# Patient Record
Sex: FEMALE | URBAN - METROPOLITAN AREA
[De-identification: names, ages, dates, MRNs, and addresses within clinical notes are randomized per-mention and may not be internally consistent; named-entity substitution may affect disease eponyms.]

---
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
## 2022-12-03 ENCOUNTER — HOSPITAL ENCOUNTER (EMERGENCY)
Age: 22
Discharge: LEFT BEFORE DISCHARGE | End: 2022-12-03
Payer: MEDICAID

## 2022-12-03 ENCOUNTER — APPOINTMENT (OUTPATIENT)
Dept: GENERAL RADIOLOGY | Age: 22
End: 2022-12-03
Payer: MEDICAID

## 2022-12-03 VITALS
SYSTOLIC BLOOD PRESSURE: 97 MMHG | WEIGHT: 150 LBS | RESPIRATION RATE: 16 BRPM | OXYGEN SATURATION: 96 % | DIASTOLIC BLOOD PRESSURE: 75 MMHG | HEART RATE: 72 BPM | TEMPERATURE: 98.2 F

## 2022-12-03 DIAGNOSIS — M25.569 KNEE PAIN, UNSPECIFIED CHRONICITY, UNSPECIFIED LATERALITY: Primary | ICD-10-CM

## 2022-12-03 PROCEDURE — 99211 OFF/OP EST MAY X REQ PHY/QHP: CPT

## 2022-12-03 RX ORDER — DOCUSATE SODIUM 100 MG/1
100 CAPSULE, LIQUID FILLED ORAL 2 TIMES DAILY
COMMUNITY

## 2022-12-03 ASSESSMENT — PAIN DESCRIPTION - DESCRIPTORS: DESCRIPTORS: ACHING

## 2022-12-03 ASSESSMENT — PAIN SCALES - GENERAL: PAINLEVEL_OUTOF10: 8

## 2022-12-03 ASSESSMENT — PAIN DESCRIPTION - LOCATION: LOCATION: KNEE

## 2022-12-03 ASSESSMENT — PAIN DESCRIPTION - ORIENTATION: ORIENTATION: RIGHT

## 2022-12-03 ASSESSMENT — PAIN - FUNCTIONAL ASSESSMENT: PAIN_FUNCTIONAL_ASSESSMENT: 0-10
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
## 2022-12-04 NOTE — DISCHARGE INSTR - COC
Continuity of Care Form    Patient Name: Jacqueline Stuart   :  2000  MRN:  [id number]    Admit date:  12/3/2022  Discharge date:  ***    Code Status Order: No Order   Advance Directives:     Admitting Physician:  No admitting provider for patient encounter. PCP: No primary care provider on file. Discharging Nurse: [facility] Unit/Room#:   Discharging Unit Phone Number: ***    Emergency Contact:   Extended Emergency Contact Information  Primary Emergency Contact: mickie box  Home Phone: [phone]  Relation: Parent    Past Surgical History:  Past Surgical History:   Procedure Laterality Date    FRACTURE SURGERY         Immunization History: There is no immunization history on file for this patient. Active Problems: There is no problem list on file for this patient. Isolation/Infection:   Isolation            No Isolation          Patient Infection Status       None to display            Nurse Assessment:  Last Vital Signs: BP 97/75   Pulse 72   Temp 98.2 °F (36.8 °C)   Resp 16   Wt 150 lb (68 kg)   LMP 2022 (Approximate)   SpO2 96%     Last documented pain score (0-10 scale): Pain Level: 8  Last Weight:   Wt Readings from Last 1 Encounters:   22 150 lb (68 kg)     Mental Status:  {IP PT MENTAL STATUS:}    IV Access:  { SUSANNE IV ACCESS:352255790}    Nursing Mobility/ADLs:  Walking   {CHP DME ZXQ}  Transfer  {CHP DME EUDU:112491196}  Bathing  {CHP DME WNVK:401601033}  Dressing  {CHP DME HTPL:869985950}  Toileting  {CHP DME ILKN:004949720}  Feeding  {CHP DME KOXD:624370452}  Med Admin  {CHP DME QIYK:973247739}  Med Delivery   { SUSANNE MED Delivery:394152327}    Wound Care Documentation and Therapy:        Elimination:  Continence:    Bowel: {YES / TF:71446}  Bladder: {YES / TV:50805}  Urinary Catheter: {Urinary Catheter:381581522}   Colostomy/Ileostomy/Ileal Conduit: {YES / ME:20661}       Date of Last BM: ***  No intake or output data in the 24 hours ending 22 2350  No intake/output data recorded.     Safety Concerns:     [address] Safety Concerns:196333318}    Impairments/Disabilities:      [address] SK[address]NER Impairments/Disabilities:523911602}    Nutrition Therapy:  Current Nutrition Therapy:   [address] SKINNER Diet List:377941657}    Routes of Feeding: {CHP DME Other Feedings:334331665}  Liquids: {Slp liquid thickness:58828}  Daily Fluid Restriction: {CHP DME Yes amt example:955437067}  Last Modified Barium Swallow with Video (Video Swallowing Test): {Done Not Done WVYC:112749982}    Treatments at the Time of Hospital Discharge:   Respiratory Treatments: ***  Oxygen Therapy:  {Therapy; copd oxygen:81452}  Ventilator:    { CC Vent PVXR:957367722}    Rehab Therapies: {THERAPEUTIC INTERVENTION:7054203321}  Weight Bearing Status/Restrictions: Eddie ORTIZ Weight Bearin}  Other Medical Equipment (for information only, NOT a DME order):  {EQUIPMENT:042951491}  Other Treatments: ***    Patient's personal belongings (please select all that are sent with patient):  {CHP DME Belongings:713103210}    RN SIGNATURE:  {Esignature:916762819}    CASE MANAGEMENT/SOCIAL WORK SECTION    Inpatient Status Date: ***    Readmission Risk Assessment Score:  Readmission Risk              Risk of Unplanned Readmission:  0           Discharging to Facility/ Agency   Name:   Address:  Phone:  Fax:    Dialysis Facility (if applicable)   Name:  Address:  Dialysis Schedule:  Phone:  Fax:    / signature: {Esignature:156835161}    PHYSICIAN SECTION    Prognosis: {Prognosis:6817643632}    Condition at Discharge: 50Fani Sandoval Patient Condition:544925696}    Rehab Potential (if transferring to Rehab): {Prognosis:8125018414}    Recommended Labs or Other Treatments After Discharge: ***    Physician Certification: I certify the above information and transfer of [facility]  is necessary for the continuing treatment of the diagnosis listed and that she requires {Admit to Appropriate Level of Care:73370} for {GREATER/LESS:194496661} 30 days.      Update Admission H&P: {P DME Changes in HELUD:923362180}    PHYSICIAN SIGNATURE:  {Esignature:412133174}